# Patient Record
Sex: FEMALE | Race: ASIAN | NOT HISPANIC OR LATINO
[De-identification: names, ages, dates, MRNs, and addresses within clinical notes are randomized per-mention and may not be internally consistent; named-entity substitution may affect disease eponyms.]

---

## 2017-01-10 ENCOUNTER — APPOINTMENT (OUTPATIENT)
Dept: GYNECOLOGIC ONCOLOGY | Facility: CLINIC | Age: 57
End: 2017-01-10

## 2017-01-31 ENCOUNTER — APPOINTMENT (OUTPATIENT)
Dept: GYNECOLOGIC ONCOLOGY | Facility: CLINIC | Age: 57
End: 2017-01-31

## 2017-01-31 VITALS
DIASTOLIC BLOOD PRESSURE: 76 MMHG | BODY MASS INDEX: 25.16 KG/M2 | SYSTOLIC BLOOD PRESSURE: 118 MMHG | HEIGHT: 63 IN | WEIGHT: 142 LBS

## 2017-08-02 ENCOUNTER — APPOINTMENT (OUTPATIENT)
Dept: GYNECOLOGIC ONCOLOGY | Facility: CLINIC | Age: 57
End: 2017-08-02
Payer: COMMERCIAL

## 2017-08-02 VITALS
WEIGHT: 144 LBS | BODY MASS INDEX: 25.52 KG/M2 | HEIGHT: 63 IN | SYSTOLIC BLOOD PRESSURE: 122 MMHG | DIASTOLIC BLOOD PRESSURE: 74 MMHG

## 2017-08-02 PROCEDURE — 99213 OFFICE O/P EST LOW 20 MIN: CPT

## 2018-02-13 ENCOUNTER — APPOINTMENT (OUTPATIENT)
Dept: GYNECOLOGIC ONCOLOGY | Facility: CLINIC | Age: 58
End: 2018-02-13
Payer: COMMERCIAL

## 2018-02-13 VITALS
BODY MASS INDEX: 26.22 KG/M2 | WEIGHT: 148 LBS | DIASTOLIC BLOOD PRESSURE: 78 MMHG | SYSTOLIC BLOOD PRESSURE: 118 MMHG | HEIGHT: 63 IN

## 2018-02-13 PROCEDURE — 99213 OFFICE O/P EST LOW 20 MIN: CPT

## 2018-03-12 ENCOUNTER — OTHER (OUTPATIENT)
Age: 58
End: 2018-03-12

## 2018-08-08 ENCOUNTER — OUTPATIENT (OUTPATIENT)
Dept: OUTPATIENT SERVICES | Facility: HOSPITAL | Age: 58
LOS: 1 days | Discharge: HOME | End: 2018-08-08

## 2018-08-08 DIAGNOSIS — Z98.89 OTHER SPECIFIED POSTPROCEDURAL STATES: Chronic | ICD-10-CM

## 2018-08-08 DIAGNOSIS — S83.209A UNSPECIFIED TEAR OF UNSPECIFIED MENISCUS, CURRENT INJURY, UNSPECIFIED KNEE, INITIAL ENCOUNTER: Chronic | ICD-10-CM

## 2018-08-08 DIAGNOSIS — R79.89 OTHER SPECIFIED ABNORMAL FINDINGS OF BLOOD CHEMISTRY: ICD-10-CM

## 2018-08-17 ENCOUNTER — APPOINTMENT (OUTPATIENT)
Dept: GYNECOLOGIC ONCOLOGY | Facility: CLINIC | Age: 58
End: 2018-08-17
Payer: COMMERCIAL

## 2018-08-17 VITALS
HEIGHT: 63 IN | WEIGHT: 143 LBS | SYSTOLIC BLOOD PRESSURE: 110 MMHG | BODY MASS INDEX: 25.34 KG/M2 | DIASTOLIC BLOOD PRESSURE: 70 MMHG

## 2018-08-17 PROCEDURE — 99213 OFFICE O/P EST LOW 20 MIN: CPT

## 2018-08-21 ENCOUNTER — RESULT REVIEW (OUTPATIENT)
Age: 58
End: 2018-08-21

## 2018-08-21 ENCOUNTER — APPOINTMENT (OUTPATIENT)
Dept: HEMATOLOGY ONCOLOGY | Facility: CLINIC | Age: 58
End: 2018-08-21
Payer: COMMERCIAL

## 2018-08-21 VITALS
DIASTOLIC BLOOD PRESSURE: 96 MMHG | BODY MASS INDEX: 24.46 KG/M2 | HEIGHT: 64.96 IN | TEMPERATURE: 98.5 F | WEIGHT: 146.83 LBS | HEART RATE: 71 BPM | SYSTOLIC BLOOD PRESSURE: 165 MMHG | RESPIRATION RATE: 16 BRPM | OXYGEN SATURATION: 95 %

## 2018-08-21 DIAGNOSIS — J30.9 ALLERGIC RHINITIS, UNSPECIFIED: ICD-10-CM

## 2018-08-21 LAB
BASOPHILS # BLD AUTO: 0.1 K/UL — SIGNIFICANT CHANGE UP (ref 0–0.2)
BASOPHILS NFR BLD AUTO: 5 % — HIGH (ref 0–2)
EOSINOPHIL # BLD AUTO: 0 K/UL — SIGNIFICANT CHANGE UP (ref 0–0.5)
EOSINOPHIL NFR BLD AUTO: 4 % — SIGNIFICANT CHANGE UP (ref 0–6)
HCT VFR BLD CALC: 40.1 % — SIGNIFICANT CHANGE UP (ref 34.5–45)
HGB BLD-MCNC: 14.5 G/DL — SIGNIFICANT CHANGE UP (ref 11.5–15.5)
LYMPHOCYTES # BLD AUTO: 1.5 K/UL — SIGNIFICANT CHANGE UP (ref 1–3.3)
LYMPHOCYTES # BLD AUTO: 31 % — SIGNIFICANT CHANGE UP (ref 13–44)
MCHC RBC-ENTMCNC: 31 PG — SIGNIFICANT CHANGE UP (ref 27–34)
MCHC RBC-ENTMCNC: 36 G/DL — SIGNIFICANT CHANGE UP (ref 32–36)
MCV RBC AUTO: 85.9 FL — SIGNIFICANT CHANGE UP (ref 80–100)
MONOCYTES # BLD AUTO: 0.2 K/UL — SIGNIFICANT CHANGE UP (ref 0–0.9)
MONOCYTES NFR BLD AUTO: 3 % — SIGNIFICANT CHANGE UP (ref 2–14)
NEUTROPHILS # BLD AUTO: 2.5 K/UL — SIGNIFICANT CHANGE UP (ref 1.8–7.4)
NEUTROPHILS NFR BLD AUTO: 55 % — SIGNIFICANT CHANGE UP (ref 43–77)
NEUTS BAND # BLD: 2 % — SIGNIFICANT CHANGE UP (ref 0–8)
PLAT MORPH BLD: NORMAL — SIGNIFICANT CHANGE UP
PLATELET # BLD AUTO: 283 K/UL — SIGNIFICANT CHANGE UP (ref 150–400)
RBC # BLD: 4.67 M/UL — SIGNIFICANT CHANGE UP (ref 3.8–5.2)
RBC # FLD: 11.3 % — SIGNIFICANT CHANGE UP (ref 10.3–14.5)
RBC BLD AUTO: NORMAL — SIGNIFICANT CHANGE UP
WBC # BLD: 4.3 K/UL — SIGNIFICANT CHANGE UP (ref 3.8–10.5)
WBC # FLD AUTO: 4.3 K/UL — SIGNIFICANT CHANGE UP (ref 3.8–10.5)

## 2018-08-21 PROCEDURE — 99245 OFF/OP CONSLTJ NEW/EST HI 55: CPT

## 2018-08-21 RX ORDER — LEVOCETIRIZINE DIHYDROCHLORIDE 5 MG/1
5 TABLET, FILM COATED ORAL DAILY
Refills: 0 | Status: ACTIVE | COMMUNITY

## 2018-09-06 ENCOUNTER — RESULT REVIEW (OUTPATIENT)
Age: 58
End: 2018-09-06

## 2018-09-06 ENCOUNTER — APPOINTMENT (OUTPATIENT)
Dept: HEMATOLOGY ONCOLOGY | Facility: CLINIC | Age: 58
End: 2018-09-06

## 2018-09-06 LAB
BASOPHILS # BLD AUTO: 0.1 K/UL — SIGNIFICANT CHANGE UP (ref 0–0.2)
BASOPHILS NFR BLD AUTO: 1.2 % — SIGNIFICANT CHANGE UP (ref 0–2)
EOSINOPHIL # BLD AUTO: 0.4 K/UL — SIGNIFICANT CHANGE UP (ref 0–0.5)
EOSINOPHIL NFR BLD AUTO: 8.8 % — HIGH (ref 0–6)
FERRITIN SERPL-MCNC: 599 NG/ML
HCT VFR BLD CALC: 43.8 % — SIGNIFICANT CHANGE UP (ref 34.5–45)
HGB BLD-MCNC: 14.8 G/DL — SIGNIFICANT CHANGE UP (ref 11.5–15.5)
IRON SATN MFR SERPL: 31 %
IRON SERPL-MCNC: 112 UG/DL
LYMPHOCYTES # BLD AUTO: 1.5 K/UL — SIGNIFICANT CHANGE UP (ref 1–3.3)
LYMPHOCYTES # BLD AUTO: 31.1 % — SIGNIFICANT CHANGE UP (ref 13–44)
MCHC RBC-ENTMCNC: 29.2 PG — SIGNIFICANT CHANGE UP (ref 27–34)
MCHC RBC-ENTMCNC: 33.9 G/DL — SIGNIFICANT CHANGE UP (ref 32–36)
MCV RBC AUTO: 86.3 FL — SIGNIFICANT CHANGE UP (ref 80–100)
MONOCYTES # BLD AUTO: 0.3 K/UL — SIGNIFICANT CHANGE UP (ref 0–0.9)
MONOCYTES NFR BLD AUTO: 6.8 % — SIGNIFICANT CHANGE UP (ref 2–14)
NEUTROPHILS # BLD AUTO: 2.5 K/UL — SIGNIFICANT CHANGE UP (ref 1.8–7.4)
NEUTROPHILS NFR BLD AUTO: 52.2 % — SIGNIFICANT CHANGE UP (ref 43–77)
PLATELET # BLD AUTO: 265 K/UL — SIGNIFICANT CHANGE UP (ref 150–400)
RBC # BLD: 5.08 M/UL — SIGNIFICANT CHANGE UP (ref 3.8–5.2)
RBC # FLD: 11.3 % — SIGNIFICANT CHANGE UP (ref 10.3–14.5)
TIBC SERPL-MCNC: 361 UG/DL
UIBC SERPL-MCNC: 249 UG/DL
WBC # BLD: 4.8 K/UL — SIGNIFICANT CHANGE UP (ref 3.8–10.5)
WBC # FLD AUTO: 4.8 K/UL — SIGNIFICANT CHANGE UP (ref 3.8–10.5)

## 2018-12-11 ENCOUNTER — OUTPATIENT (OUTPATIENT)
Dept: OUTPATIENT SERVICES | Facility: HOSPITAL | Age: 58
LOS: 1 days | Discharge: HOME | End: 2018-12-11

## 2018-12-11 DIAGNOSIS — Z98.89 OTHER SPECIFIED POSTPROCEDURAL STATES: Chronic | ICD-10-CM

## 2018-12-11 DIAGNOSIS — R79.89 OTHER SPECIFIED ABNORMAL FINDINGS OF BLOOD CHEMISTRY: ICD-10-CM

## 2018-12-11 DIAGNOSIS — S83.209A UNSPECIFIED TEAR OF UNSPECIFIED MENISCUS, CURRENT INJURY, UNSPECIFIED KNEE, INITIAL ENCOUNTER: Chronic | ICD-10-CM

## 2018-12-13 NOTE — CONSULT LETTER
[Dear  ___] : Dear  [unfilled], [Consult Letter:] : I had the pleasure of evaluating your patient, [unfilled]. [Please see my note below.] : Please see my note below. [Consult Closing:] : Thank you very much for allowing me to participate in the care of this patient.  If you have any questions, please do not hesitate to contact me. [Sincerely,] : Sincerely, [DrGabby  ___] : Dr. PADILLA

## 2018-12-17 ENCOUNTER — APPOINTMENT (OUTPATIENT)
Dept: HEMATOLOGY ONCOLOGY | Facility: CLINIC | Age: 58
End: 2018-12-17
Payer: COMMERCIAL

## 2018-12-17 ENCOUNTER — RESULT REVIEW (OUTPATIENT)
Age: 58
End: 2018-12-17

## 2018-12-17 VITALS
WEIGHT: 144.4 LBS | DIASTOLIC BLOOD PRESSURE: 74 MMHG | RESPIRATION RATE: 16 BRPM | TEMPERATURE: 98 F | BODY MASS INDEX: 24.06 KG/M2 | OXYGEN SATURATION: 98 % | HEART RATE: 78 BPM | SYSTOLIC BLOOD PRESSURE: 130 MMHG

## 2018-12-17 DIAGNOSIS — R79.89 OTHER SPECIFIED ABNORMAL FINDINGS OF BLOOD CHEMISTRY: ICD-10-CM

## 2018-12-17 LAB
BASOPHILS # BLD AUTO: 0.1 K/UL — SIGNIFICANT CHANGE UP (ref 0–0.2)
BASOPHILS NFR BLD AUTO: 1.7 % — SIGNIFICANT CHANGE UP (ref 0–2)
EOSINOPHIL # BLD AUTO: 0.2 K/UL — SIGNIFICANT CHANGE UP (ref 0–0.5)
EOSINOPHIL NFR BLD AUTO: 5.7 % — SIGNIFICANT CHANGE UP (ref 0–6)
FERRITIN SERPL-MCNC: 600 NG/ML
HCT VFR BLD CALC: 39.6 % — SIGNIFICANT CHANGE UP (ref 34.5–45)
HGB BLD-MCNC: 14.7 G/DL — SIGNIFICANT CHANGE UP (ref 11.5–15.5)
LYMPHOCYTES # BLD AUTO: 1.5 K/UL — SIGNIFICANT CHANGE UP (ref 1–3.3)
LYMPHOCYTES # BLD AUTO: 36.5 % — SIGNIFICANT CHANGE UP (ref 13–44)
MCHC RBC-ENTMCNC: 31.5 PG — SIGNIFICANT CHANGE UP (ref 27–34)
MCHC RBC-ENTMCNC: 37.1 G/DL — HIGH (ref 32–36)
MCV RBC AUTO: 84.9 FL — SIGNIFICANT CHANGE UP (ref 80–100)
MONOCYTES # BLD AUTO: 0.3 K/UL — SIGNIFICANT CHANGE UP (ref 0–0.9)
MONOCYTES NFR BLD AUTO: 6.3 % — SIGNIFICANT CHANGE UP (ref 2–14)
NEUTROPHILS # BLD AUTO: 2 K/UL — SIGNIFICANT CHANGE UP (ref 1.8–7.4)
NEUTROPHILS NFR BLD AUTO: 49.8 % — SIGNIFICANT CHANGE UP (ref 43–77)
PLATELET # BLD AUTO: 277 K/UL — SIGNIFICANT CHANGE UP (ref 150–400)
RBC # BLD: 4.67 M/UL — SIGNIFICANT CHANGE UP (ref 3.8–5.2)
RBC # FLD: 11.2 % — SIGNIFICANT CHANGE UP (ref 10.3–14.5)
WBC # BLD: 4.1 K/UL — SIGNIFICANT CHANGE UP (ref 3.8–10.5)
WBC # FLD AUTO: 4.1 K/UL — SIGNIFICANT CHANGE UP (ref 3.8–10.5)

## 2018-12-17 PROCEDURE — 99213 OFFICE O/P EST LOW 20 MIN: CPT

## 2018-12-17 NOTE — ASSESSMENT
[FreeTextEntry1] : 59yo Chinese F with no PMHx, referred for elevated ferritin\par Iron sat -normal\par \par -iron studies today\par \par -hemochromatosis gene mutation NOT present; MRI abdomen showed min-mild iron deposition. If ferritin at repeat testing is rising and iron sat is high, then pt would need a liver biopsy to show iron overload. Pt did have hepatic steatosis -NAFLD (Non-Alcoholic Fatty Liver Disease) is a common cause of elevated ferritin resulting from injury to hepatocytes despite total normal iron stores. Advised pt to lose weight and f/u with GI\par \par -follow up in 4 months

## 2018-12-17 NOTE — RESULTS/DATA
[FreeTextEntry1] : Today's CBC (On 12/17/18) wbc 4.1 Hb 14.7 plt 277\par \par On 8/21/18) wbc 4.3 Hb 14.5 plt 283 normal diff\par On 5/7/18 ferritin 667 iron 87 TIBC 252 iron sat 26% wbc 4.6 Hb 14.7 plt 304. Hep A, B, C negative. Alpha 1 antitrypsin 100 ceruloplasmin 24, ANCA negative. Hemochromatosis DNA test -negative for mutations in C282Y, H63D, S65C\par \par RADIOLOGY\par On 6/11/18 MRI abdomen with IV contrast: mod to severe hepatic steatosis. 2 benign cysts. Small enhancing lesion inferior R lobe likely benign hemangioma. Iron quantification is compatible with minimal to mild iron deposition T2* 21ms

## 2018-12-17 NOTE — REVIEW OF SYSTEMS
[Negative] : Heme/Lymph [Shortness Of Breath] : no shortness of breath [Wheezing] : no wheezing [Abdominal Pain] : no abdominal pain [Vomiting] : no vomiting [Constipation] : no constipation [Diarrhea] : no diarrhea [Dysuria] : no dysuria [Joint Pain] : no joint pain [Skin Rash] : no skin rash [Proptosis] : no proptosis [Easy Bleeding] : no tendency for easy bleeding [Easy Bruising] : no tendency for easy bruising [FreeTextEntry7] : last colonoscopy -May 2018. No BRPBR [FreeTextEntry8] : last mammogram -2018 normal [FreeTextEntry1] : year round

## 2018-12-17 NOTE — HISTORY OF PRESENT ILLNESS
[de-identified] : Ms. Beck was referred to my office after blood work done by her GI physician showed an elevated ferritin level. She had BRBPR secondary to hemorrhoids, underwent colonscopy in May 2018, and blood work done prior to it showed normal iron testing (iron 87, iron sat 26%), with a high ferritin level of 667ng/ml. She had hep A, B, C serology done-negative. Hemochromatosis gene mutation done showed no mutations (tested for C282Y, H63D and S65C). She had and MRI abdomen done on 6/11/18 showing moderate to severe hepatosteatosis, hemangioma, and iron quantification c/w minimal to mild iron deposition (T2* 21ms). \par \par The patient reports she infrequently eats red meat (1-2x/month) and she does not drink. No FHX of liver disease. She eats green leafy vegetables often.  [de-identified] : The patient is here for high ferritin levels. She saw GI.

## 2018-12-18 LAB
IRON SATN MFR SERPL: 42 %
IRON SERPL-MCNC: 150 UG/DL
TIBC SERPL-MCNC: 356 UG/DL
UIBC SERPL-MCNC: 206 UG/DL

## 2019-02-06 ENCOUNTER — APPOINTMENT (OUTPATIENT)
Dept: GYNECOLOGIC ONCOLOGY | Facility: CLINIC | Age: 59
End: 2019-02-06
Payer: COMMERCIAL

## 2019-02-06 VITALS
HEIGHT: 64.96 IN | SYSTOLIC BLOOD PRESSURE: 124 MMHG | WEIGHT: 136 LBS | DIASTOLIC BLOOD PRESSURE: 82 MMHG | BODY MASS INDEX: 22.66 KG/M2

## 2019-02-06 DIAGNOSIS — C54.1 MALIGNANT NEOPLASM OF ENDOMETRIUM: ICD-10-CM

## 2019-02-06 PROCEDURE — 99213 OFFICE O/P EST LOW 20 MIN: CPT

## 2019-02-06 NOTE — HISTORY OF PRESENT ILLNESS
[FreeTextEntry1] : Stage IA grade 1 endometrial cancer. No myometrial invasion. Negative LVSI. \par MSI screen negative. \par RTLH and BS on 7/20/16. \par \par \par Returns for surveillance visit. \par Denies bleeding, pain or other disease associated symptoms. \par \par Mammogram March 2018 BIRADS 1.

## 2019-02-06 NOTE — PHYSICAL EXAM
[Absent] : Uterus: Absent [Normal] : Recto-Vaginal Exam: Normal [Fully active, able to carry on all pre-disease performance without restriction] : Status 0 - Fully active, able to carry on all pre-disease performance without restriction [de-identified] : well healed [facility]

## 2019-03-07 ENCOUNTER — OUTPATIENT (OUTPATIENT)
Dept: OUTPATIENT SERVICES | Facility: HOSPITAL | Age: 59
LOS: 1 days | Discharge: HOME | End: 2019-03-07

## 2019-03-07 DIAGNOSIS — Z98.89 OTHER SPECIFIED POSTPROCEDURAL STATES: Chronic | ICD-10-CM

## 2019-03-07 DIAGNOSIS — R79.89 OTHER SPECIFIED ABNORMAL FINDINGS OF BLOOD CHEMISTRY: ICD-10-CM

## 2019-03-07 DIAGNOSIS — S83.209A UNSPECIFIED TEAR OF UNSPECIFIED MENISCUS, CURRENT INJURY, UNSPECIFIED KNEE, INITIAL ENCOUNTER: Chronic | ICD-10-CM

## 2019-03-18 ENCOUNTER — APPOINTMENT (OUTPATIENT)
Dept: HEMATOLOGY ONCOLOGY | Facility: CLINIC | Age: 59
End: 2019-03-18

## 2019-03-29 DIAGNOSIS — R92.2 INCONCLUSIVE MAMMOGRAM: ICD-10-CM

## 2019-08-16 ENCOUNTER — APPOINTMENT (OUTPATIENT)
Dept: GYNECOLOGIC ONCOLOGY | Facility: CLINIC | Age: 59
End: 2019-08-16

## 2020-08-17 ENCOUNTER — APPOINTMENT (OUTPATIENT)
Dept: ORTHOPEDIC SURGERY | Facility: CLINIC | Age: 60
End: 2020-08-17
Payer: COMMERCIAL

## 2020-08-17 PROCEDURE — 99203 OFFICE O/P NEW LOW 30 MIN: CPT

## 2020-08-17 PROCEDURE — 73562 X-RAY EXAM OF KNEE 3: CPT | Mod: LT

## 2020-08-17 NOTE — CONSULT LETTER
[Dear  ___] : Dear  [unfilled], [Please see my note below.] : Please see my note below. [Consult Letter:] : I had the pleasure of evaluating your patient, [unfilled]. [Sincerely,] : Sincerely, [Consult Closing:] : Thank you very much for allowing me to participate in the care of this patient.  If you have any questions, please do not hesitate to contact me. [FreeTextEntry2] : KEITH VAZQUEZ [FreeTextEntry3] : Celestino Lamb M.D.

## 2020-08-17 NOTE — DISCUSSION/SUMMARY
[de-identified] : I discussed the underlying pathophysiology of the patient's condition in great detail with the patient. I went over the patient's x-rays with them in great detail. We discussed the use of ice, Tylenol and anti-inflammatories to relieve pain. The patient was instructed in ROM exercises they are to do at home. At-home strengthening, and stretching exercises were discussed and demonstrated with the patient. We went over the wide ranging benefits of exercise for the patient health and I encouraged her to begin a diligent exercise program. She needs to avoid high-impact activities such as running and jumping. I recommend alternative activities such as riding a stationary bike on low tension, or the elliptical. She should focus on light weight and high repetition exercises. She should avoid squatting and kneeling. At this time, she will start a course of physical therapy for strengthening and flexibility. A prescription for physical therapy was provided. All of her questions were answered. She understands and consents to the plan. \par \par FU 1 month.\par after undergoing PT for her left knee.

## 2020-08-17 NOTE — PHYSICAL EXAM
[de-identified] : Constitutional\par o Appearance : well-nourished, well developed, alert, in no acute distress \par Head and Face\par o Head :\par ¦ Inspection : atraumatic, normocephalic\par o Face :\par ¦ Inspection : no visible rash or discoloration\par Respiratory\par o Respiratory Effort: breathing unlabored \par Neurologic\par o Mental Status Examination :\par ¦ Orientation : grossly oriented to person, place and time\par Psychiatric\par o Mood and Affect: mood normal, affect appropriate \par \par Right Lower Extremity\par o Buttock : no tenderness, swelling or deformities \par o Right Hip :\par ¦ Inspection/Palpation : no tenderness, swelling or deformities\par ¦ Range of Motion : full and painless in all planes, no crepitance\par ¦ Stability : joint stability intact\par ¦ Strength : extension, flexion, adduction, abduction, internal rotation and external rotation 4+/5 \par \par o Right Knee :\par ¦ Inspection/Palpation : no tenderness to palpation, no swelling\par ¦ Range of Motion : active flexion and extension full and painless, no crepitance, decreased patellofemoral glide\par ¦ Stability : no valgus or varus instability present on provocative testing\par ¦ Strength : flexion and extension 4+/5\par ¦ Tests and Signs : Anterior Drawer negative, Lachman negative, Waldo's negative\par \par Left Lower Extremity\par o Buttock : no tenderness, swelling or deformities \par o Left Hip :\par ¦ Inspection/Palpation : no tenderness, no swelling or deformities\par ¦ Range of Motion : full and painless in all planes, no crepitance\par ¦ Stability : joint stability intact\par ¦ Strength : extension, flexion, adduction, abduction, internal rotation and external rotation 4-/5\par \par o Left Knee :\par ¦ Inspection/Palpation : no medial or lateral compartment tenderness to palpation, no swelling, arthroscopic portals are well healed.\par ¦ Range of Motion : active flexion and extension full, mild pain with full flexion, mild patellofemoral crepitance, decreased patellofemoral glide\par ¦ Stability : no valgus or varus instability present on provocative testing\par ¦ Strength : flexion and extension 4-/5\par ¦ Tests and Signs : Anterior Drawer negative, Lachman negative, Waldo's negative\par \par Gait: normal gait, no significant extremity swelling or lymphedema, good proprioception and balance, leg lengths equal, no drop foot\par \par Radiology Results:\par Left Knee: Standing AP, lateral and tunnel views were obtained and reveal mild medial compartment narrowing with mild patellofemoral arthritis.

## 2020-08-17 NOTE — ADDENDUM
[FreeTextEntry1] : I, Davi Bains, acted solely as a scribe for Dr. Celestino Lamb on this date 08/17/2020.\par All medical record entries made by the Scribe were at my, Dr. Celestino Lamb, direction and personally dictated by me on 08/17/2020. I have reviewed the chart and agree that the record accurately reflects my personal performance of the history, physical exam, assessment and plan. I have also personally directed, reviewed, and agreed with the chart.

## 2020-09-14 ENCOUNTER — TRANSCRIPTION ENCOUNTER (OUTPATIENT)
Age: 60
End: 2020-09-14

## 2020-09-17 ENCOUNTER — APPOINTMENT (OUTPATIENT)
Dept: ORTHOPEDIC SURGERY | Facility: CLINIC | Age: 60
End: 2020-09-17
Payer: COMMERCIAL

## 2020-09-17 DIAGNOSIS — M76.52 PATELLAR TENDINITIS, LEFT KNEE: ICD-10-CM

## 2020-09-17 PROCEDURE — 99213 OFFICE O/P EST LOW 20 MIN: CPT

## 2020-10-15 ENCOUNTER — APPOINTMENT (OUTPATIENT)
Dept: ORTHOPEDIC SURGERY | Facility: CLINIC | Age: 60
End: 2020-10-15
Payer: COMMERCIAL

## 2020-10-15 DIAGNOSIS — M17.12 UNILATERAL PRIMARY OSTEOARTHRITIS, LEFT KNEE: ICD-10-CM

## 2020-10-15 PROCEDURE — 99213 OFFICE O/P EST LOW 20 MIN: CPT

## 2021-04-07 ENCOUNTER — APPOINTMENT (OUTPATIENT)
Dept: CT IMAGING | Facility: CLINIC | Age: 61
End: 2021-04-07
Payer: COMMERCIAL

## 2021-04-07 ENCOUNTER — OUTPATIENT (OUTPATIENT)
Dept: OUTPATIENT SERVICES | Facility: HOSPITAL | Age: 61
LOS: 1 days | End: 2021-04-07
Payer: COMMERCIAL

## 2021-04-07 DIAGNOSIS — Z98.89 OTHER SPECIFIED POSTPROCEDURAL STATES: Chronic | ICD-10-CM

## 2021-04-07 DIAGNOSIS — Z00.8 ENCOUNTER FOR OTHER GENERAL EXAMINATION: ICD-10-CM

## 2021-04-07 DIAGNOSIS — S83.209A UNSPECIFIED TEAR OF UNSPECIFIED MENISCUS, CURRENT INJURY, UNSPECIFIED KNEE, INITIAL ENCOUNTER: Chronic | ICD-10-CM

## 2021-04-07 PROCEDURE — 74177 CT ABD & PELVIS W/CONTRAST: CPT | Mod: 26

## 2021-04-07 PROCEDURE — 74177 CT ABD & PELVIS W/CONTRAST: CPT

## 2021-04-07 PROCEDURE — 82565 ASSAY OF CREATININE: CPT

## 2021-06-29 ENCOUNTER — APPOINTMENT (OUTPATIENT)
Dept: RADIOLOGY | Facility: CLINIC | Age: 61
End: 2021-06-29
Payer: COMMERCIAL

## 2021-06-29 PROCEDURE — 77080 DXA BONE DENSITY AXIAL: CPT

## 2021-07-07 ENCOUNTER — RESULT REVIEW (OUTPATIENT)
Age: 61
End: 2021-07-07

## 2021-07-07 ENCOUNTER — APPOINTMENT (OUTPATIENT)
Dept: MRI IMAGING | Facility: CLINIC | Age: 61
End: 2021-07-07
Payer: COMMERCIAL

## 2021-07-07 ENCOUNTER — OUTPATIENT (OUTPATIENT)
Dept: OUTPATIENT SERVICES | Facility: HOSPITAL | Age: 61
LOS: 1 days | End: 2021-07-07
Payer: COMMERCIAL

## 2021-07-07 DIAGNOSIS — Z98.89 OTHER SPECIFIED POSTPROCEDURAL STATES: Chronic | ICD-10-CM

## 2021-07-07 DIAGNOSIS — S83.209A UNSPECIFIED TEAR OF UNSPECIFIED MENISCUS, CURRENT INJURY, UNSPECIFIED KNEE, INITIAL ENCOUNTER: Chronic | ICD-10-CM

## 2021-07-07 DIAGNOSIS — Z00.8 ENCOUNTER FOR OTHER GENERAL EXAMINATION: ICD-10-CM

## 2021-07-07 PROCEDURE — A9585: CPT

## 2021-07-07 PROCEDURE — 74183 MRI ABD W/O CNTR FLWD CNTR: CPT | Mod: 26

## 2021-07-07 PROCEDURE — 74183 MRI ABD W/O CNTR FLWD CNTR: CPT

## 2022-03-25 ENCOUNTER — APPOINTMENT (OUTPATIENT)
Dept: ULTRASOUND IMAGING | Facility: IMAGING CENTER | Age: 62
End: 2022-03-25
Payer: COMMERCIAL

## 2022-03-25 ENCOUNTER — OUTPATIENT (OUTPATIENT)
Dept: OUTPATIENT SERVICES | Facility: HOSPITAL | Age: 62
LOS: 1 days | End: 2022-03-25
Payer: COMMERCIAL

## 2022-03-25 DIAGNOSIS — S83.209A UNSPECIFIED TEAR OF UNSPECIFIED MENISCUS, CURRENT INJURY, UNSPECIFIED KNEE, INITIAL ENCOUNTER: Chronic | ICD-10-CM

## 2022-03-25 DIAGNOSIS — Z00.8 ENCOUNTER FOR OTHER GENERAL EXAMINATION: ICD-10-CM

## 2022-03-25 DIAGNOSIS — Z98.89 OTHER SPECIFIED POSTPROCEDURAL STATES: Chronic | ICD-10-CM

## 2022-03-25 PROCEDURE — 76536 US EXAM OF HEAD AND NECK: CPT

## 2022-03-25 PROCEDURE — 76536 US EXAM OF HEAD AND NECK: CPT | Mod: 26

## 2022-07-21 ENCOUNTER — APPOINTMENT (OUTPATIENT)
Dept: MAMMOGRAPHY | Facility: CLINIC | Age: 62
End: 2022-07-21

## 2022-07-21 PROCEDURE — 77063 BREAST TOMOSYNTHESIS BI: CPT

## 2022-07-21 PROCEDURE — 77067 SCR MAMMO BI INCL CAD: CPT

## 2022-08-25 ENCOUNTER — APPOINTMENT (OUTPATIENT)
Dept: ULTRASOUND IMAGING | Facility: CLINIC | Age: 62
End: 2022-08-25

## 2022-08-25 PROCEDURE — 76641 ULTRASOUND BREAST COMPLETE: CPT | Mod: 50

## 2023-03-26 ENCOUNTER — NON-APPOINTMENT (OUTPATIENT)
Age: 63
End: 2023-03-26

## 2023-03-27 ENCOUNTER — APPOINTMENT (OUTPATIENT)
Dept: ORTHOPEDIC SURGERY | Facility: CLINIC | Age: 63
End: 2023-03-27
Payer: COMMERCIAL

## 2023-03-27 DIAGNOSIS — S39.012A STRAIN OF MUSCLE, FASCIA AND TENDON OF LOWER BACK, INITIAL ENCOUNTER: ICD-10-CM

## 2023-03-27 PROCEDURE — 99214 OFFICE O/P EST MOD 30 MIN: CPT

## 2023-03-27 PROCEDURE — 72100 X-RAY EXAM L-S SPINE 2/3 VWS: CPT

## 2023-03-27 PROCEDURE — 72170 X-RAY EXAM OF PELVIS: CPT

## 2023-05-04 ENCOUNTER — APPOINTMENT (OUTPATIENT)
Dept: ORTHOPEDIC SURGERY | Facility: CLINIC | Age: 63
End: 2023-05-04
Payer: COMMERCIAL

## 2023-05-04 PROCEDURE — 99214 OFFICE O/P EST MOD 30 MIN: CPT

## 2023-06-14 ENCOUNTER — APPOINTMENT (OUTPATIENT)
Dept: ORTHOPEDIC SURGERY | Facility: CLINIC | Age: 63
End: 2023-06-14
Payer: COMMERCIAL

## 2023-06-14 VITALS — WEIGHT: 138 LBS | BODY MASS INDEX: 24.45 KG/M2 | HEIGHT: 63 IN

## 2023-06-14 DIAGNOSIS — M47.816 SPONDYLOSIS W/OUT MYELOPATHY OR RADICULOPATHY, LUMBAR REGION: ICD-10-CM

## 2023-06-14 DIAGNOSIS — M16.0 BILATERAL PRIMARY OSTEOARTHRITIS OF HIP: ICD-10-CM

## 2023-06-14 DIAGNOSIS — M76.51 PATELLAR TENDINITIS, RIGHT KNEE: ICD-10-CM

## 2023-06-14 PROCEDURE — 99213 OFFICE O/P EST LOW 20 MIN: CPT

## 2023-06-14 NOTE — HISTORY OF PRESENT ILLNESS
[de-identified] : 62 year old female presents for follow-up evaluation. She finished PT for the hip and knee and is feeling better. She has no complaints and will do home exercises. \par PMHx of HTN (now on amlodipine). She was started on Fosamax a year ago.\par \par Radiology Results 05/04/2023\par o Right Knee : Standing AP, lateral, tunnel, and skyline views of the knee were obtained and revealed mild tricompartmental osteoarthritis. \par \par Radiology Results from 03/27/2023:\par o Lumbosacral Spine : AP and lateral views were obtained and revealed osteopenia, diffuse facet arthropathy with anterior osteophytes diffusely worse at L1/L2 where there is degenerative disc disease. There is degenerative disc disease of the lower thoracic spine. \par o Pelvis : AP pelvis was obtained and revealed degenerative arthritis of both SI joints and moderate degenerative arthritis of both hips.

## 2023-06-14 NOTE — DISCUSSION/SUMMARY
[de-identified] : I went over the pathophysiology of the patient's symptoms in great detail with the patient. At-home strengthening exercises were discussed and demonstrated with the patient. \par \par All of their questions were answered. They understand and consent to the plan. \par \par FU as needed.

## 2023-06-14 NOTE — PHYSICAL EXAM
[de-identified] : Constitutional\par o Appearance : well-nourished, well developed, alert, in no acute distress \par Head and Face\par o Head :\par ¦ Inspection : atraumatic, normocephalic\par o Face :\par ¦ Inspection : no visible rash or discoloration\par Respiratory\par o Respiratory Effort: breathing unlabored \par Neurologic\par o Mental Status Examination :\par ¦ Orientation : alert and oriented X 3\par Psychiatric\par o Mood and Affect: mood normal, affect appropriate\par Cardiovascular\par o Observation/Palpation : - no swelling\par Lymphatic\par o Additional Nodes : No palpable lymph nodes present\par \par Lumbosacral Spine\par o Inspection : no visible rash or discoloration\par o Palpation : no paraspinal musculature tenderness, no SI joint or sciatic notch tenderness \par o Range of Motion : sidebending to the left causes mild discomfort \par o Muscle Strength : paraspinal muscle strength and tone within normal limits\par o Muscle Tone : paraspinal muscle strength and tone within normal limits\par o Tests: straight leg test negative and HERI test negative bilaterally\par  \par Right Lower Extremity\par o Buttock : no tenderness, swelling or deformities\par o Right Hip :\par ¦ Inspection/Palpation : no greater trochanteric or ITB tenderness, no swelling or deformities\par ¦ Range of Motion : full and painless in all planes, no crepitance\par ¦ Stability : joint stability intact\par ¦ Strength : extension, flexion 4+/5, adduction, abduction 5/5, internal rotation and external rotation\par ¦ Tests: Estela’s test negative\par \par o Right Knee :\par ¦ Inspection/Palpation : inferior patellar tenderness to palpation, no swelling, mild varus deformity\par ¦ Range of Motion : 0-140 active flexion and extension full and painless, no crepitance\par ¦ Stability : no valgus or varus instability present on provocative testing\par ¦ Strength : flexion and extension 5/5\par ¦ Tests and Signs : negative Anterior Drawer, negative Lachman, negative Waldo \par \par Left Lower Extremity\par o Buttock : no tenderness, swelling or deformities \par o Left Hip :\par ¦ Inspection/Palpation : no tenderness, no swelling or deformities\par ¦ Range of Motion : full and painless in all planes, no crepitance\par ¦ Stability : joint stability intact\par ¦ Strength : extension, flexion 5/5, adduction, abduction, internal rotation and external rotation\par ¦ Tests: Estela’s test negative\par \par o Left Knee :\par ¦ Inspection/Palpation : no tenderness to palpation, no swelling\par ¦ Range of Motion : active flexion and extension full and painless, no crepitance\par ¦ Stability : no valgus or varus instability present on provocative testing\par ¦ Strength : flexion and extension 5/5\par ¦ Tests and Signs : negative Anterior Drawer, negative Lachman, negative Waldo \par \par Gait: gait normal, no significant extremity swelling or lymphedema, good proprioception and balance, equal leg lengths, no foot drop, good core strength, hamstrings well stretched \par \par Radiology Results\par o Right Knee : Standing AP, lateral, tunnel, and skyline views of the knee were obtained and revealed mild tricompartmental osteoarthritis.

## 2023-06-14 NOTE — ADDENDUM
[FreeTextEntry1] : I, DAVIDMARISSA TREVINO, acted solely as a scribe for Dr. Celestino Lamb on this date 06/14/2023.\par \par All medical record entries made by the Scribe were at my, Dr. Celestino Lamb, direction and personally dictated by me on 06/14/2023. I have reviewed the chart and agree that the record accurately reflects my personal performance of the history, physical exam, assessment and plan. I have also personally directed, reviewed, and agreed with the chart.

## 2023-10-18 ENCOUNTER — APPOINTMENT (OUTPATIENT)
Dept: MAMMOGRAPHY | Facility: CLINIC | Age: 63
End: 2023-10-18
Payer: COMMERCIAL

## 2023-10-18 PROCEDURE — 77063 BREAST TOMOSYNTHESIS BI: CPT

## 2023-10-18 PROCEDURE — 77067 SCR MAMMO BI INCL CAD: CPT

## 2023-11-21 ENCOUNTER — APPOINTMENT (OUTPATIENT)
Dept: ULTRASOUND IMAGING | Facility: CLINIC | Age: 63
End: 2023-11-21
Payer: COMMERCIAL

## 2023-11-21 PROCEDURE — 76641 ULTRASOUND BREAST COMPLETE: CPT | Mod: 50

## 2024-10-21 ENCOUNTER — APPOINTMENT (OUTPATIENT)
Dept: ULTRASOUND IMAGING | Facility: CLINIC | Age: 64
End: 2024-10-21
Payer: COMMERCIAL

## 2024-10-21 ENCOUNTER — APPOINTMENT (OUTPATIENT)
Dept: MAMMOGRAPHY | Facility: CLINIC | Age: 64
End: 2024-10-21
Payer: COMMERCIAL

## 2024-10-21 PROCEDURE — 77063 BREAST TOMOSYNTHESIS BI: CPT

## 2024-10-21 PROCEDURE — 77067 SCR MAMMO BI INCL CAD: CPT

## 2024-10-21 PROCEDURE — 76641 ULTRASOUND BREAST COMPLETE: CPT | Mod: 50

## 2025-07-03 NOTE — HISTORY OF PRESENT ILLNESS
[de-identified] : 60 year old female presents complaining of left knee pain for 5 weeks. She had meniscal surgery with Dr. Lamb in the left knee about 7 years ago. Her knee was feeling okay until she was running 5 weeks ago and felt a buckling with pain under her kneecap. She does not run regularly and this was a new activity. She has had some swelling and warmth to her knee but it has improved overall. She still has pain when going down stairs. She does not have pain when walking on flat ground or when sleeping. She has not noticed any more buckling or catching. She has been icing and elevating the knee. The patient denies taking any medication to manage her pain. In the past, she has done PT for her knee and it has helped.  
04-Jul-2025